# Patient Record
Sex: MALE | Race: WHITE | Employment: UNEMPLOYED | URBAN - METROPOLITAN AREA
[De-identification: names, ages, dates, MRNs, and addresses within clinical notes are randomized per-mention and may not be internally consistent; named-entity substitution may affect disease eponyms.]

---

## 2020-04-02 ENCOUNTER — HOSPITAL ENCOUNTER (EMERGENCY)
Age: 45
Discharge: HOME | End: 2020-04-02
Attending: EMERGENCY MEDICINE | Admitting: EMERGENCY MEDICINE
Payer: MEDICAID

## 2020-04-02 VITALS
TEMPERATURE: 98.7 F | HEIGHT: 67 IN | DIASTOLIC BLOOD PRESSURE: 89 MMHG | WEIGHT: 135 LBS | BODY MASS INDEX: 21.19 KG/M2 | SYSTOLIC BLOOD PRESSURE: 126 MMHG | OXYGEN SATURATION: 97 % | RESPIRATION RATE: 16 BRPM | HEART RATE: 97 BPM

## 2020-04-02 DIAGNOSIS — J01.00 ACUTE NON-RECURRENT MAXILLARY SINUSITIS: Primary | ICD-10-CM

## 2020-04-02 PROCEDURE — 99284 EMERGENCY DEPT VISIT MOD MDM: CPT

## 2020-04-02 RX ORDER — TRIAMCINOLONE ACETONIDE 55 UG/1
2 SPRAY, METERED NASAL DAILY
Qty: 1 BOTTLE | Refills: 0 | Status: SHIPPED | OUTPATIENT
Start: 2020-04-02

## 2020-04-02 RX ORDER — LORATADINE 10 MG/1
TABLET ORAL
Qty: 30 TAB | Refills: 0 | Status: SHIPPED | OUTPATIENT
Start: 2020-04-02

## 2020-04-02 NOTE — ED TRIAGE NOTES
Pt recently arrived from [place]. States mabry wouldn't let him stay with him until he was checked out because of congestion and occasional cough.

## 2020-04-02 NOTE — DISCHARGE INSTRUCTIONS
SPECIFIC PATIENT INSTRUCTIONS FROM THE PHYSICIAN WHO TREATED YOU IN THE ER TODAY:  1. Return if any concerns or worsening of condition(s)  2. FOLLOW UP APPOINTMENT:  Your primary doctor in 1 week. 3.  Nasacort AQ and claritin for the next week and then stop. If sinus congestion recurs, then restart the nasacort AQ and claritin for a week at a time. 4.  Use a Nedi Pot to help clear your sinuses. You may purchase the original Nedi Pot at a pharmacy or get a generic version at [employer] or [facility]. Patient Education        Sinusitis: Care Instructions  Your Care Instructions    Sinusitis is an infection of the lining of the sinus cavities in your head. Sinusitis often follows a cold. It causes pain and pressure in your head and face. In most cases, sinusitis gets better on its own in 1 to 2 weeks. But some mild symptoms may last for several weeks. Sometimes antibiotics are needed. Follow-up care is a key part of your treatment and safety. Be sure to make and go to all appointments, and call your doctor if you are having problems. It's also a good idea to know your test results and keep a list of the medicines you take. How can you care for yourself at home? · Take an over-the-counter pain medicine, such as acetaminophen (Tylenol), ibuprofen (Advil, Motrin), or naproxen (Aleve). Read and follow all instructions on the label. · If the doctor prescribed antibiotics, take them as directed. Do not stop taking them just because you feel better. You need to take the full course of antibiotics. · Be careful when taking over-the-counter cold or flu medicines and Tylenol at the same time. Many of these medicines have acetaminophen, which is Tylenol. Read the labels to make sure that you are not taking more than the recommended dose. Too much acetaminophen (Tylenol) can be harmful. · Breathe warm, moist air from a steamy shower, a hot bath, or a sink filled with hot water. Avoid cold, dry air. Using a humidifier in your home may help. Follow the directions for cleaning the machine. · Use saline (saltwater) nasal washes to help keep your nasal passages open and wash out mucus and bacteria. You can buy saline nose drops at a grocery store or drugstore. Or you can make your own at home by adding 1 teaspoon of salt and 1 teaspoon of baking soda to 2 cups of distilled water. If you make your own, fill a bulb syringe with the solution, insert the tip into your nostril, and squeeze gently. Rozann Lolling your nose. · Put a hot, wet towel or a warm gel pack on your face 3 or 4 times a day for 5 to 10 minutes each time. · Try a decongestant nasal spray like oxymetazoline (Afrin). Do not use it for more than 3 days in a row. Using it for more than 3 days can make your congestion worse. When should you call for help? Call your doctor now or seek immediate medical care if:    · You have new or worse swelling or redness in your face or around your eyes.     · You have a new or higher fever.    Watch closely for changes in your health, and be sure to contact your doctor if:    · You have new or worse facial pain.     · The mucus from your nose becomes thicker (like pus) or has new blood in it.     · You are not getting better as expected. Where can you learn more? Go to http://jennifer-sury.info/  Enter I414 in the search box to learn more about \"Sinusitis: Care Instructions. \"  Current as of: July 28, 2019Content Version: 12.4  © 5601-7215 Healthwise, Incorporated. Care instructions adapted under license by [employer] (which disclaims liability or warranty for this information). If you have questions about a medical condition or this instruction, always ask your healthcare professional. [address] any warranty or liability for your use of this information.          Patient Education        Saline Nasal Washes: Care Instructions  Your Care Instructions  Saline nasal washes help keep the nasal passages open by washing out thick or dried mucus. This simple remedy can help relieve symptoms of allergies, sinusitis, and colds. It also can make the nose feel more comfortable by keeping the mucous membranes moist. You may notice a little burning sensation in your nose the first few times you use the solution, but this usually gets better in a few days. Follow-up care is a key part of your treatment and safety. Be sure to make and go to all appointments, and call your doctor if you are having problems. It's also a good idea to know your test results and keep a list of the medicines you take. How can you care for yourself at home? · You can buy premixed saline solution in a squeeze bottle or other sinus rinse products at a drugstore. Read and follow the instructions on the label. · You also can make your own saline solution by adding 1 teaspoon of salt and 1 teaspoon of baking soda to 2 cups of distilled water. · If you use a homemade solution, pour a small amount into a clean bowl. Using a rubber bulb syringe, squeeze the syringe and place the tip in the salt water. Pull a small amount of the salt water into the syringe by relaxing your hand. · Sit down with your head tilted slightly back. Do not lie down. Put the tip of the bulb syringe or the squeeze bottle a little way into one of your nostrils. Gently drip or squirt a few drops into the nostril. Repeat with the other nostril. Some sneezing and gagging are normal at first.  · Gently blow your nose. · Wipe the syringe or bottle tip clean after each use. · Repeat this 2 or 3 times a day. · Use nasal washes gently if you have nosebleeds often. When should you call for help? Watch closely for changes in your health, and be sure to contact your doctor if:    · You often get nosebleeds.     · You have problems doing the nasal washes. Where can you learn more?   Go to http://jennifer-sury.info/  Enter B784 in the search box to learn more about \"Saline Nasal Washes: Care Instructions. \"  Current as of: 2019Content Version: 12.4  © 8566-0036 Healthwise, Incorporated. Care instructions adapted under license by [employer] (which disclaims liability or warranty for this information). If you have questions about a medical condition or this instruction, always ask your healthcare professional. [address] any warranty or liability for your use of this information. [email or web address] Activation    Thank you for requesting access to [facility]. Please follow the instructions below to securely access and download your online medical record. [facility] allows you to send messages to your doctor, view your test results, renew your prescriptions, schedule appointments, and more. How Do I Sign Up? In your internet browser, go to [email or web address]. Click on the First Time User? Click Here link in the Sign In box. You will see the New Member Sign Up page. Enter your [facility] Access Code exactly as it appears below. You will not need to use this code after you´ve completed the sign-up process. If you do not sign up before the expiration date, you must request a new code. [facility] Access Code: [email or web address]  Expires: 3/28/2019  2:27 PM (This is the date your [facility] access code will )    Enter the last four digits of your Social Security Number (xxxx) and Date of Birth (mm/dd/yyyy) as indicated and click Submit. You will be taken to the next sign-up page. Create a [facility] ID. This will be your [facility] login ID and cannot be changed, so think of one that is secure and easy to remember. Create a [facility] password. You can change your password at any time. Enter your Password Reset Question and Answer. This can be used at a later time if you forget your password. Enter your e-mail address.  You will receive e-mail notification when new information is available in [facility]. Click Sign Up. You can now view and download portions of your medical record. Click the SafeMedia link to download a portable copy of your medical information. Additional Information    If you have questions, please visit the Frequently Asked Questions section of the [facility] website at [email or web address] Remember, [facility] is NOT to be used for urgent needs. For medical emergencies, dial 911.

## 2024-06-24 NOTE — ED PROVIDER NOTES
Office Note  Chief Complaint   Patient presents with    Follow-up     ER visit       HPI: The patient is a 29-year-old female who presents for follow-up after an ER visit.    The patient recently visited the ER due to symptoms of dizziness, headache, and elevated heart rate, accompanied by transient loss of consciousness. Despite the absence of significant findings during her ER visit, she continues to experience dizziness and occasional headaches. She has been maintaining adequate hydration, consuming over 60 ounces of water with electrolytes daily. She has not yet consulted with a neurologist. In 01/2024, she underwent a Holter monitor test, during which she experienced symptoms for several hours, daily heart palpitations, dizziness, pre-syncope, head pressure, and anxiety. However, these symptoms have since subsided. The loss of consciousness is a new symptom for her. Her last cardiology consultation was in 03/2024, with a follow-up in 3 months.    The patient has been adhering to her prescribed medication regimen since 01/2024, which has effectively managed her anxiety and stress. However, she still experiences occasional sleep disturbances. She reports feelings of nervousness and anxiety 3 to 4 days per week, excessive worry 3 to 4 times per week, and difficulty relaxing 3 to 4 days per week. She denies restlessness and irritability, but admits to feeling easily annoyed and irritable 3 to 4 days a week. She expresses a constant fear of impending doom, which is affecting her daily life and work performance. Despite her lack of interest in activities, she admits to feeling down and depressed 2 to 3 days per week. She struggles with sleep for 6 nights, experiences fatigue 2 to 3 days per week, and overeats twice a week due to emotional eating. She expresses feelings of self-deprecation about 5 to 6 days per week. She denies any issues with concentration, but has been informed that her speech is rapid 1 day per  [facility] EMERGENCY DEPT      9:46 AM    Date: 4/2/2020  Patient Name: Jean Velez    History of Presenting Illness     Chief Complaint   Patient presents with    Allergies    Chest Congestion       53 y.o. male with noted past medical history who presents to the emergency department with sinus congestion for 3 days. The patient states that he was in [place] visiting his mother who recently passed away. The patient start having some mild sinus congestion in [place] and upon arrival back here had increased sinus congestion and postnasal drip. He states that he believes that the pollen down in the [place]. Eventually was also contributing factor to the increase in his sinus congestion. He is only has a cough secondary to postnasal drip. He denies any chest congestion fever chills or chest pain. Patient denies any other associated signs or symptoms. Patient denies any other complaints. Nursing notes regarding the HPI and triage nursing notes were reviewed. Prior medical records were reviewed. Past History     Past Medical History:  History reviewed. No pertinent past medical history. Past Surgical History:  Past Surgical History:   Procedure Laterality Date    HX HEENT      jaw surgery    HX HEENT      dental       Family History:  History reviewed. No pertinent family history. Social History:  Social History     Tobacco Use    Smoking status: Current Every Day Smoker     Packs/day: 1.00     Years: 25.00     Pack years: 25.00    Smokeless tobacco: Never Used   Substance Use Topics    Alcohol use: Not Currently     Frequency: Never    Drug use: Never       Allergies:  No Known Allergies    Patient's primary care provider (as noted in EPIC):  No primary care provider on file. Review of Systems   Constitutional: Negative for diaphoresis. HENT: Positive for postnasal drip. Negative for congestion. Eyes: Negative for discharge.    Respiratory: Negative for shortness of breath, wheezing and stridor. Cardiovascular: Negative for chest pain and palpitations. Gastrointestinal: Negative for diarrhea. Endocrine: Negative for heat intolerance. Genitourinary: Negative for flank pain. Musculoskeletal: Negative for back pain. Neurological: Negative for weakness. Psychiatric/Behavioral: Negative for hallucinations. All other systems reviewed and are negative. Visit Vitals  /89 (BP 1 Location: Left arm, BP Patient Position: Sitting)   Pulse (!) 105   Temp 98.7 °F (37.1 °C)   Resp 16   Ht 5' 7\" (1.702 m)   Wt 61.2 kg (135 lb)   SpO2 100%   BMI 21.14 kg/m²       PHYSICAL EXAM:    CONSTITUTIONAL:  Alert, in no apparent distress;  well developed;  well nourished. HEAD:  Normocephalic, atraumatic. Sinuses:  Sinus pressure with leaning head forward. Sinus tenderness to percussion. EYES:  EOMI. Non-icteric sclera. Normal conjunctiva. ENTM:  Nose:  no rhinorrhea. Throat:  no erythema or exudate, mucous membranes moist.  NECK:  No JVD. Supple  RESPIRATORY:  Chest clear, equal breath sounds, good air movement. CARDIOVASCULAR:  Regular rate and rhythm. No murmurs, rubs, or gallops. GI:  Normal bowel sounds, abdomen soft and non-tender. No rebound or guarding. BACK:  Non-tender. UPPER EXT:  Normal inspection. LOWER EXT:  No edema, no calf tenderness. Distal pulses intact. NEURO:  Moves all four extremities, and grossly normal motor exam.  SKIN:  No rashes;  Normal for age. PSYCH:  Alert and normal affect. Diagnostic Study Results     Abnormal lab results from this emergency department encounter:  Labs Reviewed - No data to display    Lab values for this patient within approximately the last 12 hours:  No results found for this or any previous visit (from the past 12 hour(s)). Radiologist and cardiologist interpretations if available at time of this note:  No results found.     Medication(s) ordered for patient during this week. She denies any self-harm or suicidal ideation. She is currently taking hydroxyzine as needed.      Review of Systems   All other systems reviewed and are negative.      Current Medications    ADALIMUMAB (HUMIRA, 2 SYRINGE,) 40 MG/0.4ML CITRATE FREE    Inject 1 syringe into the skin every 14 days.    HYDROXYZINE (ATARAX) 25 MG TABLET    TAKE 1 TABLET BY MOUTH EVERY 8 HOURS AS NEEDED FOR ANXIETY    LOPERAMIDE (IMODIUM) 2 MG CAPSULE    TAKE 2 CAPSULES BY MOUTH FOUR TIMES DAILY(WITH MEALS AND NIGHTLY)    MULTIPLE VITAMINS-MINERALS (VITAMIN - THERAPEUTIC MULTIVITAMINS W/MINERALS) TABLET        VITAMIN D, ERGOCALCIFEROL, 1.25 MG (50,000 UNITS) CAPSULE    TAKE 1 CAPSULE BY MOUTH 1 DAY A WEEK.        ALLERGIES:   Allergen Reactions    Ampicillin ANAPHYLAXIS     Anaphylaxis 11/1/17 after ampicillin/cefoxitin intra-op    Cefoxitin ANAPHYLAXIS     Anaphylaxis 11/1/17 after ampicillin/cefoxitin intra-op    Iodine   (Environmental Or Med) ANAPHYLAXIS    Iothalamate ANAPHYLAXIS    Latex Other (See Comments) and ANAPHYLAXIS     Unknown      Cefotaxime Other (See Comments)     Unknown    Contrast Media Other (See Comments)    Penicillins Other (See Comments)     Unknown      Rocuronium Other (See Comments)       Visit Vitals  /72   Pulse 87   Temp 98.9 °F (37.2 °C)   Resp 16   Ht 5' 8\" (1.727 m)   Wt 90 kg (198 lb 6.6 oz)   LMP 05/28/2024 (Exact Date)   SpO2 99%   BMI 30.17 kg/m²       Physical Exam  Constitutional:       Appearance: She is well-developed.   Cardiovascular:      Rate and Rhythm: Normal rate and regular rhythm.      Heart sounds: Normal heart sounds.   Pulmonary:      Effort: Pulmonary effort is normal.      Breath sounds: Normal breath sounds.   Abdominal:      General: There is no distension.      Palpations: Abdomen is soft.      Tenderness: There is no abdominal tenderness.   Musculoskeletal:         General: Normal range of motion.      Cervical back: Normal range of motion and neck supple.    Skin:     General: Skin is warm.   Neurological:      Mental Status: She is alert and oriented to person, place, and time.         Assessment and Plan  Problem List Items Addressed This Visit          Mental Health    Anxiety    Relevant Medications    escitalopram (LEXAPRO) 5 MG tablet    Mild major depression (CMD)    Relevant Medications    escitalopram (LEXAPRO) 5 MG tablet     Other Visit Diagnoses       Syncope, unspecified syncope type    -  Primary    Relevant Orders    SERVICE TO NEUROLOGY    Dizziness        Relevant Orders    SERVICE TO NEUROLOGY    Nonintractable headache, unspecified chronicity pattern, unspecified headache type        Relevant Orders    SERVICE TO NEUROLOGY          Orders Placed This Encounter    SERVICE TO NEUROLOGY    escitalopram (LEXAPRO) 5 MG tablet      Depression/anxiety/syncope  The patient's answers indicate mild depressive and moderate anxiety. A referral to neurology will be initiated. The patient will commence a regimen of hydroxyzine, to be taken as needed, to aid in calming and sleep. A low dose of escitalopram will be prescribed, with 1 refill, to be taken daily simultaneously. The patient was informed that the therapeutic process should take between 3 to 6 weeks to build up in the system. Information regarding escitalopram was provided, and potential side effects were discussed.     Tachycardia/syncope  The patient's heart rate is elevated today. It was discussed that Crohn's disease can sometimes have an impact on the heart, leading to an irregular heart rhythm.  Advised to follow-up with cardiology.      Follow-up  The patient is scheduled for a follow-up visit in 1 month.       Health Maintenance       Varicella Vaccine (1 of 2 - 13+ 2-dose series)  Never done    DTaP/Tdap/Td Vaccine (1 - Tdap)  Never done    COVID-19 Vaccine (5 - 2023-24 season)  Overdue since 9/1/2023           Following review of the above:  Patient is not proceeding with: COVID-19,  emergency visit encounter:  Medications - No data to display    Medical Decision Making     I am the first provider for this patient. I reviewed the vital signs, available nursing notes, past medical history, past surgical history, family history and social history. Vital Signs:  Reviewed the patient's vital signs. ED COURSE:      DIAGNOSIS:  1. Acute sinusitis    SPECIFIC PATIENT INSTRUCTIONS FROM THE PHYSICIAN WHO TREATED YOU IN THE ER TODAY:  1. Return if any concerns or worsening of condition(s)  2. FOLLOW UP APPOINTMENT:  Your primary doctor in 1 week. 3.  Nasacort AQ and claritin for the next week and then stop. If sinus congestion recurs, then restart the nasacort AQ and claritin for a week at a time. 4.  Use a Nedi Pot to help clear your sinuses. You may purchase the original Nedi Pot at a pharmacy or get a generic version at [employer] or [employer]. Patient is improved, resting quietly and comfortably. The patient will be discharged home. The patient was reassured that these symptoms do not appear to represent a serious or life threatening condition at this time. Warning signs of worsening condition were discussed and understood by the patient. Based on patient's age, coexisting illness, exam, and the results of this ED evaluation, the decision to treat as an outpatient was made. Based on the information available at time of discharge, acute pathology requiring immediate intervention was deemed relative unlikely. While it is impossible to completely exclude the possibility of underlying serious disease or worsening of condition, I feel the relative likelihood is extremely low. I discussed this uncertainty with the patient, who understood ED evaluation and treatment and felt comfortable with the outpatient treatment plan. All questions regarding care, test results, and follow up were answered. The patient is stable and appropriate to discharge.  They Dtap/Tdap/Td, and Varicella    Note: Refer to final orders and clinician documentation.         No barriers identified.  Reviewed updated med list.  Reviewed previous labs.  Patient verbalized understanding and agrees with plan.  All of patient's questions were answered to their satisfaction.  Follow up sooner with any new concerns or complaints.     I spent a total of 30 minutes on the day of the visit. This includes pre-charting, chart review and documenting.  During appointment we discussed diagnosis, impressions, recommended diagnostic studies, risks and benefits of treatment options, instructions for management and treatment, importance of adherence with treatment, risk factor reduction, patient education.    Royce Summers PA-C 06/24/24 8:43 AM  The supervising/collaborating physician is Corin Harley MD.    understand that they should return to the emergency department for any new or worsening symptoms. I stressed the importance of follow up for repeat assessment and possibly further evaluation/treatment. Dictation disclaimer:  Please note that this dictation was completed with Stylesight, the computer voice recognition software. Quite often unanticipated grammatical, syntax, homophones, and other interpretive errors are inadvertently transcribed by the computer software. Please disregard these errors. Please excuse any errors that have escaped final proofreading. Coding Diagnoses     Clinical Impression:   1. Acute non-recurrent maxillary sinusitis        Disposition     Disposition:  Home. DALTON Kwon Board Certified Emergency Physician    Provider Attestation:  If a scribe was utilized in generation of this patient record, I personally performed the services described in the documentation, reviewed the documentation, as recorded by the scribe in my presence, and it accurately records the patient's history of presenting illness, review of systems, patient physical examination, and procedures performed by me as the attending physician. DALTON Kwon Board Certified Emergency Physician  4/2/2020.  9:48 AM